# Patient Record
Sex: MALE | Race: WHITE | NOT HISPANIC OR LATINO
[De-identification: names, ages, dates, MRNs, and addresses within clinical notes are randomized per-mention and may not be internally consistent; named-entity substitution may affect disease eponyms.]

---

## 2017-01-09 ENCOUNTER — MEDICATION RENEWAL (OUTPATIENT)
Age: 72
End: 2017-01-09

## 2017-04-03 ENCOUNTER — RX RENEWAL (OUTPATIENT)
Age: 72
End: 2017-04-03

## 2017-04-09 ENCOUNTER — RX RENEWAL (OUTPATIENT)
Age: 72
End: 2017-04-09

## 2017-07-19 ENCOUNTER — APPOINTMENT (OUTPATIENT)
Dept: ENDOCRINOLOGY | Facility: CLINIC | Age: 72
End: 2017-07-19
Payer: MEDICARE

## 2017-07-19 VITALS
BODY MASS INDEX: 29.82 KG/M2 | WEIGHT: 190 LBS | HEIGHT: 67 IN | DIASTOLIC BLOOD PRESSURE: 80 MMHG | OXYGEN SATURATION: 98 % | SYSTOLIC BLOOD PRESSURE: 120 MMHG | HEART RATE: 70 BPM

## 2017-07-19 PROCEDURE — 99213 OFFICE O/P EST LOW 20 MIN: CPT

## 2017-07-20 LAB
T4 FREE SERPL-MCNC: 1.2 NG/DL
THYROGLOB AB SERPL-ACNC: <20 IU/ML
THYROGLOB SERPL-MCNC: <0.2 NG/ML
TSH SERPL-ACNC: 0.36 UIU/ML

## 2017-07-30 ENCOUNTER — FORM ENCOUNTER (OUTPATIENT)
Age: 72
End: 2017-07-30

## 2017-07-31 ENCOUNTER — APPOINTMENT (OUTPATIENT)
Dept: ULTRASOUND IMAGING | Facility: CLINIC | Age: 72
End: 2017-07-31
Payer: MEDICARE

## 2017-07-31 ENCOUNTER — OUTPATIENT (OUTPATIENT)
Dept: OUTPATIENT SERVICES | Facility: HOSPITAL | Age: 72
LOS: 1 days | End: 2017-07-31
Payer: MEDICARE

## 2017-07-31 DIAGNOSIS — C73 MALIGNANT NEOPLASM OF THYROID GLAND: ICD-10-CM

## 2017-07-31 PROCEDURE — 76536 US EXAM OF HEAD AND NECK: CPT

## 2017-07-31 PROCEDURE — 76536 US EXAM OF HEAD AND NECK: CPT | Mod: 26

## 2017-10-24 ENCOUNTER — APPOINTMENT (OUTPATIENT)
Dept: OPHTHALMOLOGY | Facility: CLINIC | Age: 72
End: 2017-10-24
Payer: MEDICARE

## 2017-10-24 DIAGNOSIS — H54.61 UNQUALIFIED VISUAL LOSS, RIGHT EYE, NORMAL VISION LEFT EYE: ICD-10-CM

## 2017-10-24 PROCEDURE — 92014 COMPRE OPH EXAM EST PT 1/>: CPT

## 2017-10-24 PROCEDURE — 92134 CPTRZ OPH DX IMG PST SGM RTA: CPT

## 2017-10-24 PROCEDURE — 92083 EXTENDED VISUAL FIELD XM: CPT

## 2017-11-13 ENCOUNTER — APPOINTMENT (OUTPATIENT)
Dept: OPHTHALMOLOGY | Facility: CLINIC | Age: 72
End: 2017-11-13
Payer: MEDICARE

## 2017-11-13 DIAGNOSIS — H43.392 OTHER VITREOUS OPACITIES, LEFT EYE: ICD-10-CM

## 2017-11-13 DIAGNOSIS — H25.13 AGE-RELATED NUCLEAR CATARACT, BILATERAL: ICD-10-CM

## 2017-11-13 DIAGNOSIS — H43.811 VITREOUS DEGENERATION, RIGHT EYE: ICD-10-CM

## 2017-11-13 PROCEDURE — 92134 CPTRZ OPH DX IMG PST SGM RTA: CPT

## 2017-11-13 PROCEDURE — 92225: CPT | Mod: RT

## 2017-11-13 PROCEDURE — 92014 COMPRE OPH EXAM EST PT 1/>: CPT

## 2017-12-18 ENCOUNTER — RX RENEWAL (OUTPATIENT)
Age: 72
End: 2017-12-18

## 2018-03-08 ENCOUNTER — APPOINTMENT (OUTPATIENT)
Dept: RADIOLOGY | Facility: IMAGING CENTER | Age: 73
End: 2018-03-08
Payer: MEDICARE

## 2018-03-08 ENCOUNTER — OUTPATIENT (OUTPATIENT)
Dept: OUTPATIENT SERVICES | Facility: HOSPITAL | Age: 73
LOS: 1 days | End: 2018-03-08
Payer: MEDICARE

## 2018-03-08 DIAGNOSIS — Z00.8 ENCOUNTER FOR OTHER GENERAL EXAMINATION: ICD-10-CM

## 2018-03-08 PROCEDURE — 71046 X-RAY EXAM CHEST 2 VIEWS: CPT | Mod: 26

## 2018-03-08 PROCEDURE — 71046 X-RAY EXAM CHEST 2 VIEWS: CPT

## 2018-03-19 ENCOUNTER — OUTPATIENT (OUTPATIENT)
Dept: OUTPATIENT SERVICES | Facility: HOSPITAL | Age: 73
LOS: 1 days | End: 2018-03-19
Payer: MEDICARE

## 2018-03-19 ENCOUNTER — APPOINTMENT (OUTPATIENT)
Dept: RADIOLOGY | Facility: IMAGING CENTER | Age: 73
End: 2018-03-19

## 2018-03-19 DIAGNOSIS — Z00.8 ENCOUNTER FOR OTHER GENERAL EXAMINATION: ICD-10-CM

## 2018-03-19 PROCEDURE — 71046 X-RAY EXAM CHEST 2 VIEWS: CPT | Mod: 26

## 2018-03-19 PROCEDURE — 71046 X-RAY EXAM CHEST 2 VIEWS: CPT

## 2018-03-29 ENCOUNTER — RX RENEWAL (OUTPATIENT)
Age: 73
End: 2018-03-29

## 2018-04-05 ENCOUNTER — APPOINTMENT (OUTPATIENT)
Dept: OPHTHALMOLOGY | Facility: CLINIC | Age: 73
End: 2018-04-05
Payer: MEDICARE

## 2018-04-05 DIAGNOSIS — H35.371 PUCKERING OF MACULA, RIGHT EYE: ICD-10-CM

## 2018-04-05 PROCEDURE — 92014 COMPRE OPH EXAM EST PT 1/>: CPT

## 2018-04-05 PROCEDURE — 92134 CPTRZ OPH DX IMG PST SGM RTA: CPT

## 2018-04-09 ENCOUNTER — APPOINTMENT (OUTPATIENT)
Dept: RADIOLOGY | Facility: IMAGING CENTER | Age: 73
End: 2018-04-09

## 2018-04-09 ENCOUNTER — OUTPATIENT (OUTPATIENT)
Dept: OUTPATIENT SERVICES | Facility: HOSPITAL | Age: 73
LOS: 1 days | End: 2018-04-09
Payer: MEDICARE

## 2018-04-09 DIAGNOSIS — Z00.8 ENCOUNTER FOR OTHER GENERAL EXAMINATION: ICD-10-CM

## 2018-04-09 PROCEDURE — 71046 X-RAY EXAM CHEST 2 VIEWS: CPT

## 2018-04-09 PROCEDURE — 71046 X-RAY EXAM CHEST 2 VIEWS: CPT | Mod: 26

## 2018-05-30 LAB
T4 FREE SERPL-MCNC: 1.5 NG/DL
THYROGLOB AB SERPL-ACNC: <20 IU/ML
THYROGLOB SERPL-MCNC: <0.2 NG/ML
TSH SERPL-ACNC: 0.57 UIU/ML

## 2018-06-06 ENCOUNTER — APPOINTMENT (OUTPATIENT)
Dept: ENDOCRINOLOGY | Facility: CLINIC | Age: 73
End: 2018-06-06
Payer: MEDICARE

## 2018-06-06 VITALS
OXYGEN SATURATION: 98 % | HEART RATE: 91 BPM | DIASTOLIC BLOOD PRESSURE: 70 MMHG | WEIGHT: 198 LBS | BODY MASS INDEX: 31.08 KG/M2 | HEIGHT: 67 IN | SYSTOLIC BLOOD PRESSURE: 130 MMHG

## 2018-06-06 PROCEDURE — 99214 OFFICE O/P EST MOD 30 MIN: CPT

## 2018-06-06 RX ORDER — LEVOTHYROXINE SODIUM 112 UG/1
112 TABLET ORAL
Refills: 0 | Status: DISCONTINUED | COMMUNITY
End: 2018-06-06

## 2018-06-25 ENCOUNTER — FORM ENCOUNTER (OUTPATIENT)
Age: 73
End: 2018-06-25

## 2018-06-26 ENCOUNTER — OUTPATIENT (OUTPATIENT)
Dept: OUTPATIENT SERVICES | Facility: HOSPITAL | Age: 73
LOS: 1 days | End: 2018-06-26
Payer: MEDICARE

## 2018-06-26 ENCOUNTER — APPOINTMENT (OUTPATIENT)
Dept: ULTRASOUND IMAGING | Facility: IMAGING CENTER | Age: 73
End: 2018-06-26
Payer: MEDICARE

## 2018-06-26 DIAGNOSIS — C73 MALIGNANT NEOPLASM OF THYROID GLAND: ICD-10-CM

## 2018-06-26 PROCEDURE — 76536 US EXAM OF HEAD AND NECK: CPT

## 2018-06-26 PROCEDURE — 76536 US EXAM OF HEAD AND NECK: CPT | Mod: 26

## 2018-10-04 ENCOUNTER — APPOINTMENT (OUTPATIENT)
Dept: OPHTHALMOLOGY | Facility: CLINIC | Age: 73
End: 2018-10-04
Payer: MEDICARE

## 2018-10-04 DIAGNOSIS — H35.373 PUCKERING OF MACULA, BILATERAL: ICD-10-CM

## 2018-10-04 PROCEDURE — 92012 INTRM OPH EXAM EST PATIENT: CPT

## 2018-12-29 ENCOUNTER — TRANSCRIPTION ENCOUNTER (OUTPATIENT)
Age: 73
End: 2018-12-29

## 2019-04-01 ENCOUNTER — TRANSCRIPTION ENCOUNTER (OUTPATIENT)
Age: 74
End: 2019-04-01

## 2019-04-01 ENCOUNTER — APPOINTMENT (OUTPATIENT)
Dept: ENDOCRINOLOGY | Facility: CLINIC | Age: 74
End: 2019-04-01
Payer: MEDICARE

## 2019-04-01 VITALS
SYSTOLIC BLOOD PRESSURE: 110 MMHG | BODY MASS INDEX: 31.23 KG/M2 | HEIGHT: 67 IN | DIASTOLIC BLOOD PRESSURE: 80 MMHG | WEIGHT: 199 LBS

## 2019-04-01 PROCEDURE — 99213 OFFICE O/P EST LOW 20 MIN: CPT

## 2019-04-01 NOTE — PHYSICAL EXAM
[Well Nourished] : well nourished [Supple] : the neck was supple [No LAD] : no lymphadenopathy [Well Healed Scar] : well healed scar [Normal Rate] : heart rate was normal  [Normal S1, S2] : normal S1 and S2 [Regular Rhythm] : with a regular rhythm [Abdominal Aorta Normal] : the abdominal aorta was normal [Femoral Arteries Normal] : femoral pulses were normal without bruits [Soft] : abdomen soft [Not Distended] : not distended [Post Cervical Nodes] : posterior cervical nodes [Anterior Cervical Nodes] : anterior cervical nodes [No Motor Deficits] : the motor exam was normal [No Tremors] : no tremors [Oriented x3] : oriented to person, place, and time [Normal Affect] : the affect was normal [Normal Mood] : the mood was normal [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

## 2019-04-01 NOTE — HISTORY OF PRESENT ILLNESS
[FreeTextEntry1] : Patient is a 73-year-old male with history of hypertension, here for follow up visit papillary thyroid cancer.  Patient is hearing impaired and comes with .  He was diagnosed with papillary thyroid cancer in 2010.  Largest tumor or dementia was 1.8 cm, papillary thyroid cancer, T3 Nx, with minimal extrathyroidal extension.  He was treated with total thyroidectomy followed by radioactive iodine in 2010, 150 mci and had whole body scan in 2011 which showed less than 1% uptake. \par His most recent thyroid ultrasound was in June 2018, there is no evidence of locally metastatic disease.  \par He is currently taking levothyroxine 100 mcg daily.  He stated that his dose was change about one month ago by his cardiologist in the setting of his most recent blood work.  Prior to that he was taking levothyroxine 112 mcg daily.  \par His last thyroglobulin level was checked in May 2018, less than 0.2 ng/mL, thyroglobulin antibody was less than 20 international units/mL.  He has been doing well.  He reported taking some weight over the last 6 months.\par

## 2019-04-01 NOTE — ASSESSMENT
[FreeTextEntry1] : 73 -year-old male with history of thyroid cancer.\par \par \par #1 Thyroid cancer\par He had 1.8 cm papillary thyroid cancer with minimal extrathyroidal extension T3Nx treated with surgery and radioactive iodine in 2010. He is clinically euthyroid.  He has excellent biochemical and structural response.  TSH goal between 0.5-2.0uIU/mL.  He is currently on levothyroxine 100 mcg for the last one month after the dose was adjusted by his cardiologist.  We will check his TSH level today and adjust his thyroid medication as needed.  We'll also check his thyroglobulin level.  Repeat thyroid ultrasound and one year.  He will forward his laboratory results to his cardiologist and primary care doctor.

## 2019-04-03 LAB
ESTIMATED AVERAGE GLUCOSE: 105 MG/DL
HBA1C MFR BLD HPLC: 5.3 %
THYROGLOB AB SERPL-ACNC: <20 IU/ML
THYROGLOB SERPL-MCNC: <0.2 NG/ML
TSH SERPL-ACNC: 1.18 UIU/ML

## 2019-10-08 ENCOUNTER — APPOINTMENT (OUTPATIENT)
Dept: OPHTHALMOLOGY | Facility: CLINIC | Age: 74
End: 2019-10-08

## 2019-10-15 ENCOUNTER — APPOINTMENT (OUTPATIENT)
Dept: ENDOCRINOLOGY | Facility: CLINIC | Age: 74
End: 2019-10-15
Payer: MEDICARE

## 2019-10-15 VITALS
HEIGHT: 67 IN | BODY MASS INDEX: 31.39 KG/M2 | DIASTOLIC BLOOD PRESSURE: 80 MMHG | WEIGHT: 200 LBS | HEART RATE: 70 BPM | OXYGEN SATURATION: 97 % | SYSTOLIC BLOOD PRESSURE: 112 MMHG

## 2019-10-15 PROCEDURE — 99213 OFFICE O/P EST LOW 20 MIN: CPT

## 2019-10-15 NOTE — DATA REVIEWED
[FreeTextEntry1] : EXAM: US HEAD NECK SOFT TISSUE \par PROCEDURE DATE: 06/26/2018 \par  \par INTERPRETATION: Clinical data: 72-year-old man status post thyroidectomy for carcinoma. \par Comparison: Ultrasound 07/31/2017 \par Technique: Survey of the thyroid beds and cervical lymph node chains was performed. \par Findings: No soft tissue mass was identified within the thyroid beds. There was no cervical lymphadenopathy. \par Impression: \par No evidence recurrent or locally metastatic disease.

## 2019-10-15 NOTE — ASSESSMENT
[FreeTextEntry1] : 73 -year-old male with history of thyroid cancer.\par \par #1 Thyroid cancer\par He had 1.8 cm papillary thyroid cancer with minimal extrathyroidal extension T3Nx treated with surgery and radioactive iodine in 2010. He is clinically euthyroid.  He has excellent biochemical and structural response.  TSH goal between 0.5-2.0uIU/mL.  He is currently on levothyroxine 100 mcg for the last one month after the dose was adjusted by his cardiologist.  We will check his TSH level today and adjust his thyroid medication as needed.  We'll also check his thyroglobulin level.  Repeat thyroid ultrasound and one year. \par \par  [Levothyroxine] : The patient was instructed to take Levothyroxine on an empty stomach, separate from vitamins, and wait at least 30 minutes before eating

## 2019-10-15 NOTE — PHYSICAL EXAM
[Well Nourished] : well nourished [Supple] : the neck was supple [No LAD] : no lymphadenopathy [Well Healed Scar] : well healed scar [Normal Rate] : heart rate was normal  [Normal S1, S2] : normal S1 and S2 [Abdominal Aorta Normal] : the abdominal aorta was normal [Regular Rhythm] : with a regular rhythm [Femoral Arteries Normal] : femoral pulses were normal without bruits [Soft] : abdomen soft [Not Distended] : not distended [Post Cervical Nodes] : posterior cervical nodes [Anterior Cervical Nodes] : anterior cervical nodes [No Motor Deficits] : the motor exam was normal [No Tremors] : no tremors [Oriented x3] : oriented to person, place, and time [Normal Affect] : the affect was normal [Normal Mood] : the mood was normal [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

## 2019-10-15 NOTE — HISTORY OF PRESENT ILLNESS
[FreeTextEntry1] : Patient is a 73-year-old male with history of hypertension, here for follow up visit papillary thyroid cancer.  Patient is hearing impaired and comes with .  He was diagnosed with papillary thyroid cancer in 2010.  Largest tumor or dementia was 1.8 cm, papillary thyroid cancer, T3 Nx, with minimal extrathyroidal extension.  He was treated with total thyroidectomy followed by radioactive iodine in 2010, 150 mci and had whole body scan in 2011 which showed less than 1% uptake. \par \par His most recent thyroid ultrasound was in June 2018, there is no evidence of locally metastatic disease.  \par \par He is currently taking levothyroxine 100 mcg daily.  He stated that his dose was change about one month ago by his cardiologist in the setting of his most recent blood work.  Prior to that he was taking levothyroxine 112 mcg daily.  \par \par His last thyroglobulin level was checked in May 2018, less than 0.2 ng/mL, thyroglobulin antibody was less than 20 iu/mL.  He has been doing well.  \par \par He has been doing very well overall.

## 2019-10-24 ENCOUNTER — CLINICAL ADVICE (OUTPATIENT)
Age: 74
End: 2019-10-24

## 2019-10-24 LAB
T4 FREE SERPL-MCNC: 1.2 NG/DL
THYROGLOB AB SERPL-ACNC: <20 IU/ML
THYROGLOB SERPL-MCNC: <0.2 NG/ML
TSH SERPL-ACNC: 4.17 UIU/ML

## 2019-11-04 ENCOUNTER — APPOINTMENT (OUTPATIENT)
Dept: OPHTHALMOLOGY | Facility: CLINIC | Age: 74
End: 2019-11-04
Payer: MEDICARE

## 2019-11-04 ENCOUNTER — NON-APPOINTMENT (OUTPATIENT)
Age: 74
End: 2019-11-04

## 2019-11-04 PROCEDURE — 92014 COMPRE OPH EXAM EST PT 1/>: CPT

## 2019-11-08 ENCOUNTER — RX RENEWAL (OUTPATIENT)
Age: 74
End: 2019-11-08

## 2019-11-08 LAB — TSH SERPL-ACNC: 4.08 UIU/ML

## 2020-01-01 ENCOUNTER — TRANSCRIPTION ENCOUNTER (OUTPATIENT)
Age: 75
End: 2020-01-01

## 2020-05-02 ENCOUNTER — RX RENEWAL (OUTPATIENT)
Age: 75
End: 2020-05-02

## 2020-09-01 ENCOUNTER — OUTPATIENT (OUTPATIENT)
Dept: OUTPATIENT SERVICES | Facility: HOSPITAL | Age: 75
LOS: 1 days | End: 2020-09-01
Payer: MEDICARE

## 2020-09-01 ENCOUNTER — RESULT REVIEW (OUTPATIENT)
Age: 75
End: 2020-09-01

## 2020-09-01 ENCOUNTER — APPOINTMENT (OUTPATIENT)
Dept: ULTRASOUND IMAGING | Facility: CLINIC | Age: 75
End: 2020-09-01
Payer: MEDICARE

## 2020-09-01 DIAGNOSIS — C73 MALIGNANT NEOPLASM OF THYROID GLAND: ICD-10-CM

## 2020-09-01 PROCEDURE — 76536 US EXAM OF HEAD AND NECK: CPT | Mod: 26

## 2020-09-01 PROCEDURE — 76536 US EXAM OF HEAD AND NECK: CPT

## 2020-09-14 ENCOUNTER — APPOINTMENT (OUTPATIENT)
Dept: ENDOCRINOLOGY | Facility: CLINIC | Age: 75
End: 2020-09-14
Payer: MEDICARE

## 2020-09-14 VITALS
OXYGEN SATURATION: 97 % | DIASTOLIC BLOOD PRESSURE: 60 MMHG | WEIGHT: 195 LBS | HEART RATE: 58 BPM | TEMPERATURE: 96.3 F | BODY MASS INDEX: 30.61 KG/M2 | SYSTOLIC BLOOD PRESSURE: 110 MMHG | HEIGHT: 67 IN

## 2020-09-14 PROCEDURE — 99213 OFFICE O/P EST LOW 20 MIN: CPT

## 2020-09-14 NOTE — PHYSICAL EXAM
[Oriented x3] : oriented to person, place, and time [Normal Affect] : the affect was normal [Normal Mood] : the mood was normal [de-identified] : Patient appears alert.  Well-nourished.  Healthy appearance. [de-identified] : There is no proptosis. [de-identified] : Deferred due to mask wearing mandates. [de-identified] : There is no lymphadenopathy.  There is a well-healed scar. [de-identified] : No respiratory distress.  No accessory muscle use.  Normal rate and effort.  Clear to auscultation. [de-identified] : There is no murmurs, rubs, normal S1-S2. [de-identified] : There is no edema.

## 2020-09-14 NOTE — DATA REVIEWED
[FreeTextEntry1] : Thyroglobulin levels.\par September 14, 2020 thyroglobulin <0.2\par October 15, 2019, thyroglobulin level <0.2\par April 1, 2019, thyroglobulin level <0.2\par \par TSH\par 9/14/2020, TSH 1.34 levothyroxine 112 mcg\par 11/4/2019 TSH 4.08 levothyroxine 100 mcg\par

## 2020-09-14 NOTE — PROCEDURE
[FreeTextEntry1] : \par  US Head + Neck Soft Tissue             Final\par \par No Documents Attached\par \par \par \par \par   EXAM:  US HEAD NECK SOFT TISSUE\par \par \par PROCEDURE DATE:  09/01/2020\par \par \par \par INTERPRETATION:  CLINICAL INDICATION: Status post thyroidectomy for thyroid carcinoma. Evaluate for recurrent disease.\par TECHNIQUE: High-resolution linear transducer was utilized to evaluate the cervical soft tissues.\par Comparison evaluation: Ultrasound of the cervical soft tissues dated 6/26/2018.\par \par FINDINGS:\par \par No evidence of recurrent disease in the thyroidectomy bed.\par No evidence of adenopathy.\par \par IMPRESSION:\par \par No evidence of recurrent disease. No significant change compared with prior study.\par \par NIR THOMAS M.D., ATTENDING RADIOLOGIST\par This document has been electronically signed. Sep  2 2020  4:00PM\par \par

## 2020-09-14 NOTE — ASSESSMENT
[FreeTextEntry1] : Patient is a 74-year-old, hearing impaired, gentleman, with history of thyroid cancer, here for endocrinology follow-up for papillary thyroid cancer.\par \par \par #1 Thyroid cancer\par He had 1.8 cm papillary thyroid cancer with minimal extrathyroidal extension T3Nx treated with surgery and radioactive iodine in 2010. He is clinically euthyroid.  He has excellent biochemical and structural response.  TSH goal between 0.5-2.0uIU/mL.  He is currently taking levothyroxine 112 mcg once daily.  We will check his TSH level today and adjust his thyroid medication as needed.  We'll also check his thyroglobulin level.  Repeat thyroid ultrasound 1 to 2 years..

## 2020-09-14 NOTE — HISTORY OF PRESENT ILLNESS
[FreeTextEntry1] : Patient is a 74-year-old male with history of hypertension, here for follow up visit papillary thyroid cancer.  Patient is hearing impaired and comes with .  He was diagnosed with papillary thyroid cancer in 2010.  Largest tumor or dementia was 1.8 cm, papillary thyroid cancer, T3 Nx, with minimal extrathyroidal extension.  He was treated with total thyroidectomy followed by radioactive iodine in 2010, 150 mci and had whole body scan in 2011 which showed less than 1% uptake. \par \par His most recent thyroid ultrasound was in June 2018, there is no evidence of locally metastatic disease.  Another repeat ultrasound was done earlier this month.  September 3, 2020 no evidence of recurrent disease in the thyroidectomy bed.  No evidence of adenopathy.\par \par He is currently taking levothyroxine 112 mcg daily.  \par \par His last thyroglobulin level was checked in May 2018, less than 0.2 ng/mL, thyroglobulin antibody was less than 20 iu/mL.  He has been doing well.  Last thyroglobulin was done in September 2020 and is less than 0.2 ng/ml. \par \par He reports he has dry mouth in the middle of the night.  Improves with drinking water.  Worsens with eating fried food and more dry food.  But otherwise is doing very well.

## 2020-09-16 LAB
T4 FREE SERPL-MCNC: 1.2 NG/DL
THYROGLOB AB SERPL-ACNC: <20 IU/ML
THYROGLOB SERPL-MCNC: <0.2 NG/ML
TSH SERPL-ACNC: 1.34 UIU/ML

## 2020-10-26 ENCOUNTER — RX RENEWAL (OUTPATIENT)
Age: 75
End: 2020-10-26

## 2021-01-16 ENCOUNTER — TRANSCRIPTION ENCOUNTER (OUTPATIENT)
Age: 76
End: 2021-01-16

## 2021-02-09 ENCOUNTER — APPOINTMENT (OUTPATIENT)
Dept: ENDOCRINOLOGY | Facility: CLINIC | Age: 76
End: 2021-02-09
Payer: MEDICARE

## 2021-02-09 VITALS
BODY MASS INDEX: 30.61 KG/M2 | TEMPERATURE: 97 F | WEIGHT: 195 LBS | DIASTOLIC BLOOD PRESSURE: 70 MMHG | SYSTOLIC BLOOD PRESSURE: 112 MMHG | HEIGHT: 67 IN

## 2021-02-09 PROCEDURE — 99214 OFFICE O/P EST MOD 30 MIN: CPT

## 2021-02-09 RX ORDER — EZETIMIBE 10 MG/1
10 TABLET ORAL
Refills: 0 | Status: ACTIVE | COMMUNITY
Start: 2021-02-09

## 2021-02-09 RX ORDER — LOSARTAN POTASSIUM 25 MG/1
25 TABLET, FILM COATED ORAL
Refills: 0 | Status: DISCONTINUED | COMMUNITY
Start: 2017-07-19 | End: 2021-02-09

## 2021-02-09 NOTE — HISTORY OF PRESENT ILLNESS
[FreeTextEntry1] : Patient is a 75-year-old male with history of hypertension, here for follow up visit papillary thyroid cancer.  Patient is hearing impaired and comes with .  He was diagnosed with papillary thyroid cancer in 2010.  Largest tumor or dementia was 1.8 cm, papillary thyroid cancer, T3 Nx, with minimal extrathyroidal extension.  He was treated with total thyroidectomy followed by radioactive iodine in 2010, 150 mci and had whole body scan in 2011 which showed less than 1% uptake. \par \par His most recent thyroid ultrasound was in June 2018, there is no evidence of locally metastatic disease.  Another repeat ultrasound was done earlier this month.  September 3, 2020 no evidence of recurrent disease in the thyroidectomy bed.  No evidence of adenopathy.\par \par He is currently taking levothyroxine 112 mcg daily.  \par \par His last thyroglobulin level was checked in May 2018, less than 0.2 ng/mL, thyroglobulin antibody was less than 20 iu/mL.  He has been doing well.  Last thyroglobulin was done in September 2020 and is less than 0.2 ng/ml. \par \par He has been doing well since last visit.  US was done in Sept 2020 showing no evidence of recurrent disease.

## 2021-02-09 NOTE — ASSESSMENT
[FreeTextEntry1] : 75 -year-old male with history of thyroid cancer.\par \par \par #1 Thyroid cancer\par He had 1.8 cm papillary thyroid cancer with minimal extrathyroidal extension T3Nx treated with surgery and radioactive iodine in 2010. He is clinically euthyroid.  He has excellent biochemical and structural response.  TSH goal between 0.5-2.0uIU/mL.  He is currently on levothyroxine 112mcg daily. \par US in Sept 2020 was negative. \par TG in Sept 2020 was undetectable. \par \par #2 HTN\par Recently with hypertension, his cardiologist changed his medication from Losartan 50mg daily to Losartan -HCTZ 50-12.5mg once daily, states that his blood pressure has been much improved since. \par \par #3 HLD\par Patient is currently on Atorvastatin 40mg daily, tolearting well, no issues. \par Patient is also currently on Zetia 10mg daily \par FU with cardiology.

## 2021-02-09 NOTE — DATA REVIEWED
[FreeTextEntry1] : Thyroglobulin levels.\par September 14, 2020 thyroglobulin <0.2\par October 15, 2019, thyroglobulin level <0.2\par April 1, 2019, thyroglobulin level <0.2\par \par TSH\par 9/14/2020, TSH 1.34 levothyroxine 112 mcg\par 11/4/2019 TSH 4.08 levothyroxine 100 mcg\par \par \par  US Head + Neck Soft Tissue             Final\par \par No Documents Attached\par \par \par \par \par   EXAM:  US HEAD NECK SOFT TISSUE\par \par \par PROCEDURE DATE:  09/01/2020\par \par \par \par INTERPRETATION:  CLINICAL INDICATION: Status post thyroidectomy for thyroid carcinoma. Evaluate for recurrent disease.\par TECHNIQUE: High-resolution linear transducer was utilized to evaluate the cervical soft tissues.\par Comparison evaluation: Ultrasound of the cervical soft tissues dated 6/26/2018.\par \par FINDINGS:\par \par No evidence of recurrent disease in the thyroidectomy bed.\par No evidence of adenopathy.\par \par IMPRESSION:\par \par No evidence of recurrent disease. No significant change compared with prior study.\par \par \par \par \par \par \par NIR THOMAS M.D., ATTENDING RADIOLOGIST\par This document has been electronically signed. Sep  2 2020  4:00PM\par \par \par

## 2021-02-09 NOTE — PHYSICAL EXAM
[Normal Affect] : the affect was normal [Oriented x3] : oriented to person, place, and time [Normal Mood] : the mood was normal [de-identified] : Patient appears alert.  Well-nourished.  Healthy appearance. [de-identified] : There is no proptosis. [de-identified] : Deferred due to mask wearing mandates. [de-identified] : There is no lymphadenopathy.  There is a well-healed scar. [de-identified] : No respiratory distress.  No accessory muscle use.  Normal rate and effort.  Clear to auscultation. [de-identified] : There is no murmurs, rubs, normal S1-S2. [de-identified] : There is no edema.

## 2021-02-16 LAB
T4 FREE SERPL-MCNC: 1.3 NG/DL
THYROGLOB AB SERPL-ACNC: <20 IU/ML
THYROGLOB SERPL-MCNC: <0.2 NG/ML
TSH SERPL-ACNC: 0.3 UIU/ML

## 2021-03-11 ENCOUNTER — NON-APPOINTMENT (OUTPATIENT)
Age: 76
End: 2021-03-11

## 2021-03-11 ENCOUNTER — APPOINTMENT (OUTPATIENT)
Dept: OPHTHALMOLOGY | Facility: CLINIC | Age: 76
End: 2021-03-11
Payer: MEDICARE

## 2021-03-11 PROCEDURE — 92014 COMPRE OPH EXAM EST PT 1/>: CPT

## 2021-04-03 ENCOUNTER — RX RENEWAL (OUTPATIENT)
Age: 76
End: 2021-04-03

## 2021-08-10 ENCOUNTER — APPOINTMENT (OUTPATIENT)
Dept: ENDOCRINOLOGY | Facility: CLINIC | Age: 76
End: 2021-08-10
Payer: MEDICARE

## 2021-08-10 VITALS
HEIGHT: 67 IN | BODY MASS INDEX: 30.61 KG/M2 | WEIGHT: 195 LBS | SYSTOLIC BLOOD PRESSURE: 110 MMHG | HEART RATE: 66 BPM | DIASTOLIC BLOOD PRESSURE: 70 MMHG | OXYGEN SATURATION: 99 %

## 2021-08-10 LAB
ALBUMIN SERPL ELPH-MCNC: 4.5 G/DL
ALP BLD-CCNC: 61 U/L
ALT SERPL-CCNC: 23 U/L
ANION GAP SERPL CALC-SCNC: 12 MMOL/L
AST SERPL-CCNC: 25 U/L
BILIRUB SERPL-MCNC: 1 MG/DL
BUN SERPL-MCNC: 16 MG/DL
CALCIUM SERPL-MCNC: 10.1 MG/DL
CHLORIDE SERPL-SCNC: 100 MMOL/L
CHOLEST SERPL-MCNC: 148 MG/DL
CO2 SERPL-SCNC: 28 MMOL/L
CREAT SERPL-MCNC: 1.35 MG/DL
GLUCOSE SERPL-MCNC: 93 MG/DL
HDLC SERPL-MCNC: 53 MG/DL
LDLC SERPL CALC-MCNC: 62 MG/DL
NONHDLC SERPL-MCNC: 95 MG/DL
POTASSIUM SERPL-SCNC: 4.4 MMOL/L
PROT SERPL-MCNC: 6.7 G/DL
SODIUM SERPL-SCNC: 140 MMOL/L
T4 FREE SERPL-MCNC: 1.4 NG/DL
THYROGLOB AB SERPL-ACNC: <20 IU/ML
THYROGLOB SERPL-MCNC: <0.2 NG/ML
TRIGL SERPL-MCNC: 167 MG/DL
TSH SERPL-ACNC: 0.42 UIU/ML

## 2021-08-10 PROCEDURE — 99214 OFFICE O/P EST MOD 30 MIN: CPT

## 2021-08-10 NOTE — HISTORY OF PRESENT ILLNESS
[FreeTextEntry1] : Patient is a 75-year-old male with history of hypertension, here for follow up visit papillary thyroid cancer.  Patient is hearing impaired and comes with .  He was diagnosed with papillary thyroid cancer in 2010.  Largest tumor or dementia was 1.8 cm, papillary thyroid cancer, T3 Nx, with minimal extrathyroidal extension.  He was treated with total thyroidectomy followed by radioactive iodine in 2010, 150 mci and had whole body scan in 2011 which showed less than 1% uptake. \par \par His thyroid ultrasound in June 2018, there is no evidence of locally metastatic disease.  Another repeat ultrasound was done earlier this month.  September 3, 2020 no evidence of recurrent disease in the thyroidectomy bed.  No evidence of adenopathy.\par \par He is currently taking levothyroxine 112 mcg daily.  \par \par He has been doing well since last visit.  \par \par In regards to his hypertension, he is currently on losartan 50–hydrochlorothiazide 12.5 mg once daily.\par In regards to his hyperlipidemia, currently being managed by his cardiologist.  He is on Zetia 10 mg once daily as well as atorvastatin 40 mg once daily.\par \par Patient is currently is taking supplements including Centrum multivitamin, vitamin D 1000 IU daily, and  calcium supplement.

## 2021-08-10 NOTE — ASSESSMENT
[FreeTextEntry1] : Patient is a 75-year-old man with history of hypertension, hyperlipidemia, papillary thyroid cancer, status post radioactive iodine treatment in 2010, here for endocrinology follow-up.\par \par \par 1.  Papillary thyroid cancer.\par He had 1.8 cm papillary thyroid cancer with minimal extrathyroidal extension T3Nx treated with surgery and radioactive iodine in 2010. He is clinically euthyroid.  He has excellent biochemical and structural response.  TSH goal between 0.5-2.0uIU/mL.  He is currently on levothyroxine 100 mcg for the last one month after the dose was adjusted by his cardiologist. I will forward his laboratory results to his cardiologist and primary care doctor.\par Last ultrasound done in September 3, 2020: No evidence of recurrent disease.  No significant change compared to prior study.\par We will check thyroid ultrasound.\par We will obtain TSH, free T4 and thyroglobulin level today.\par \par 2.  Hypertension\par Patient is currently on losartan, hydrochlorothiazide 50-12.5 mg once daily.\par \par 3.  Hyperlipidemia\par Patient is on atorvastatin 40 mg once daily, tolerating well, no issues.\par Patient is currently on Zetia 10 mg once daily.\par Continue to follow with cardiology.

## 2021-08-10 NOTE — PHYSICAL EXAM
[Oriented x3] : oriented to person, place, and time [Normal Affect] : the affect was normal [Normal Mood] : the mood was normal [de-identified] : Patient appears alert.  Well-nourished.  Healthy appearance. [de-identified] : There is no proptosis. [de-identified] : Deferred due to mask wearing mandates. [de-identified] : There is no lymphadenopathy.  There is a well-healed scar. [de-identified] : No respiratory distress.  No accessory muscle use.  Normal rate and effort.  Clear to auscultation. [de-identified] : There is no murmurs, rubs, normal S1-S2. [de-identified] : There is no edema.

## 2021-08-10 NOTE — REASON FOR VISIT
[Follow - Up] : a follow-up visit [Thyroid Cancer] : thyroid cancer [Ad Hoc ] : provided by an ad hoc  [Source: ______] : History obtained from [facility] [FreeTextEntry3] : AMER[employer] S[employer] LANGUAGE

## 2021-09-08 ENCOUNTER — TRANSCRIPTION ENCOUNTER (OUTPATIENT)
Age: 76
End: 2021-09-08

## 2021-09-21 ENCOUNTER — OUTPATIENT (OUTPATIENT)
Dept: OUTPATIENT SERVICES | Facility: HOSPITAL | Age: 76
LOS: 1 days | End: 2021-09-21
Payer: MEDICARE

## 2021-09-21 ENCOUNTER — APPOINTMENT (OUTPATIENT)
Dept: ULTRASOUND IMAGING | Facility: CLINIC | Age: 76
End: 2021-09-21
Payer: MEDICARE

## 2021-09-21 DIAGNOSIS — C73 MALIGNANT NEOPLASM OF THYROID GLAND: ICD-10-CM

## 2021-09-21 PROCEDURE — 76536 US EXAM OF HEAD AND NECK: CPT

## 2021-09-21 PROCEDURE — 76536 US EXAM OF HEAD AND NECK: CPT | Mod: 26

## 2021-09-27 ENCOUNTER — RX RENEWAL (OUTPATIENT)
Age: 76
End: 2021-09-27

## 2022-02-18 ENCOUNTER — APPOINTMENT (OUTPATIENT)
Dept: ENDOCRINOLOGY | Facility: CLINIC | Age: 77
End: 2022-02-18
Payer: MEDICARE

## 2022-02-18 VITALS
OXYGEN SATURATION: 96 % | WEIGHT: 201 LBS | HEART RATE: 66 BPM | SYSTOLIC BLOOD PRESSURE: 120 MMHG | BODY MASS INDEX: 31.48 KG/M2 | TEMPERATURE: 97.8 F | DIASTOLIC BLOOD PRESSURE: 80 MMHG

## 2022-02-18 DIAGNOSIS — E78.5 HYPERLIPIDEMIA, UNSPECIFIED: ICD-10-CM

## 2022-02-18 DIAGNOSIS — I10 ESSENTIAL (PRIMARY) HYPERTENSION: ICD-10-CM

## 2022-02-18 PROCEDURE — 99214 OFFICE O/P EST MOD 30 MIN: CPT

## 2022-02-18 RX ORDER — LOSARTAN POTASSIUM AND HYDROCHLOROTHIAZIDE 12.5; 5 MG/1; MG/1
50-12.5 TABLET ORAL
Refills: 0 | Status: DISCONTINUED | COMMUNITY
End: 2022-02-18

## 2022-02-18 RX ORDER — LOSARTAN POTASSIUM 50 MG/1
50 TABLET, FILM COATED ORAL
Refills: 0 | Status: ACTIVE | COMMUNITY
Start: 2022-02-18

## 2022-02-18 NOTE — HISTORY OF PRESENT ILLNESS
[FreeTextEntry1] : Patient is a 76year-old male with history of hypertension, here for follow up visit papillary thyroid cancer. \par  \par Patient is hearing impaired and comes with .  \par \par He was diagnosed with papillary thyroid cancer in 2010.  Largest tumor or dementia was 1.8 cm, papillary thyroid cancer, T3 Nx, with minimal extrathyroidal extension.  He was treated with total thyroidectomy followed by radioactive iodine in 2010, 150 mci and had whole body scan in 2011 which showed less than 1% uptake. \par \par His thyroid ultrasound in June 2018, there is no evidence of locally metastatic disease.  Another repeat ultrasound was done earlier this month.  September 3, 2020 no evidence of recurrent disease in the thyroidectomy bed.  No evidence of adenopathy. US again in Sept 2021 within normal li mits.  \par \par He is currently taking levothyroxine 112 mcg daily.  \par \par He has been doing well since last visit.  \par \par In regards to his hypertension, he is currently on losartan 50mg once daily \par \par In regards to his hyperlipidemia, currently being managed by his cardiologist.  He is on Zetia 10 mg once daily as well as atorvastatin 40 mg once daily.\par \par Currently off vitamin D and calcium supplements.

## 2022-02-18 NOTE — PHYSICAL EXAM
[Oriented x3] : oriented to person, place, and time [Normal Affect] : the affect was normal [Normal Mood] : the mood was normal [de-identified] : Patient appears alert.  Well-nourished.  Healthy appearance. [de-identified] : There is no proptosis. [de-identified] : Deferred due to mask wearing mandates. [de-identified] : There is no lymphadenopathy.  There is a well-healed scar. [de-identified] : No respiratory distress.  No accessory muscle use.  Normal rate and effort.  Clear to auscultation. [de-identified] : There is no murmurs, rubs, normal S1-S2. [de-identified] : There is no edema.

## 2022-02-18 NOTE — ASSESSMENT
[FreeTextEntry1] : Patient is a 76-year-old man with history of hypertension, hyperlipidemia, papillary thyroid cancer, status post radioactive iodine treatment in 2010, here for endocrinology follow-up.\par \par 1.  Papillary thyroid cancer.\par He had 1.8 cm papillary thyroid cancer with minimal extrathyroidal extension T3Nx treated with surgery and radioactive iodine in 2010. He is clinically euthyroid.  He has excellent biochemical and structural response.  TSH goal between 0.5-2.0uIU/mL.  He is currently on levothyroxine 100 mcg for the last one month after the dose was adjusted by his cardiologist. I will forward his laboratory results to his cardiologist and primary care doctor.\par Also ultrasound done in September 21, 2021.  No Recurrent locally metastatic disease.\par Repeat ultrasound in September 2022.\par We will obtain TSH, free T4 and thyroglobulin level today.\par \par 2.  Hypertension\par Blood pressure today: 120/80\par Patient is currently on losartan, hydrochlorothiazide 50-12.5 mg once daily.\par \par 3.  Hyperlipidemia\par LDL 62 mg/dL which is currently on target blood was drawn in 8/10/2021\par Patient is on atorvastatin 40 mg once daily, tolerating well, no issues.\par Patient is currently on Zetia 10 mg once daily.\par Continue to follow with cardiology.\par \par Doing well in thyroid cancer persepective. \par FU in 1 year

## 2022-02-22 LAB
T4 FREE SERPL-MCNC: 1.5 NG/DL
THYROGLOB AB SERPL-ACNC: <20 IU/ML
THYROGLOB SERPL-MCNC: <0.2 NG/ML
TSH SERPL-ACNC: 0.48 UIU/ML

## 2022-04-13 ENCOUNTER — NON-APPOINTMENT (OUTPATIENT)
Age: 77
End: 2022-04-13

## 2022-04-13 ENCOUNTER — APPOINTMENT (OUTPATIENT)
Dept: OPHTHALMOLOGY | Facility: CLINIC | Age: 77
End: 2022-04-13
Payer: MEDICARE

## 2022-04-13 PROCEDURE — 92015 DETERMINE REFRACTIVE STATE: CPT

## 2022-04-13 PROCEDURE — 92134 CPTRZ OPH DX IMG PST SGM RTA: CPT

## 2022-04-13 PROCEDURE — 92014 COMPRE OPH EXAM EST PT 1/>: CPT

## 2022-06-25 ENCOUNTER — NON-APPOINTMENT (OUTPATIENT)
Age: 77
End: 2022-06-25

## 2022-09-20 ENCOUNTER — RX RENEWAL (OUTPATIENT)
Age: 77
End: 2022-09-20

## 2022-11-05 ENCOUNTER — NON-APPOINTMENT (OUTPATIENT)
Age: 77
End: 2022-11-05

## 2023-03-14 ENCOUNTER — APPOINTMENT (OUTPATIENT)
Dept: ENDOCRINOLOGY | Facility: CLINIC | Age: 78
End: 2023-03-14

## 2023-04-03 ENCOUNTER — RX RENEWAL (OUTPATIENT)
Age: 78
End: 2023-04-03

## 2023-04-24 ENCOUNTER — APPOINTMENT (OUTPATIENT)
Dept: ENDOCRINOLOGY | Facility: CLINIC | Age: 78
End: 2023-04-24
Payer: MEDICARE

## 2023-04-24 VITALS
BODY MASS INDEX: 31.08 KG/M2 | HEART RATE: 66 BPM | SYSTOLIC BLOOD PRESSURE: 110 MMHG | WEIGHT: 198 LBS | HEIGHT: 67 IN | DIASTOLIC BLOOD PRESSURE: 80 MMHG | OXYGEN SATURATION: 96 %

## 2023-04-24 DIAGNOSIS — E07.9 DISORDER OF THYROID, UNSPECIFIED: ICD-10-CM

## 2023-04-24 DIAGNOSIS — C73 MALIGNANT NEOPLASM OF THYROID GLAND: ICD-10-CM

## 2023-04-24 PROCEDURE — 99213 OFFICE O/P EST LOW 20 MIN: CPT

## 2023-04-24 RX ORDER — HYDROCHLOROTHIAZIDE 12.5 MG/1
12.5 TABLET ORAL
Refills: 0 | Status: ACTIVE | COMMUNITY
Start: 2023-04-24

## 2023-04-28 LAB
T4 FREE SERPL-MCNC: 1.2 NG/DL
THYROGLOB AB SERPL-ACNC: <20 IU/ML
THYROGLOB SERPL-MCNC: <0.2 NG/ML
TSH SERPL-ACNC: 0.61 UIU/ML

## 2023-05-02 ENCOUNTER — OUTPATIENT (OUTPATIENT)
Dept: OUTPATIENT SERVICES | Facility: HOSPITAL | Age: 78
LOS: 1 days | End: 2023-05-02
Payer: MEDICARE

## 2023-05-02 ENCOUNTER — APPOINTMENT (OUTPATIENT)
Dept: ULTRASOUND IMAGING | Facility: CLINIC | Age: 78
End: 2023-05-02
Payer: MEDICARE

## 2023-05-02 DIAGNOSIS — C73 MALIGNANT NEOPLASM OF THYROID GLAND: ICD-10-CM

## 2023-05-02 PROCEDURE — 76536 US EXAM OF HEAD AND NECK: CPT | Mod: 26

## 2023-05-02 PROCEDURE — 76536 US EXAM OF HEAD AND NECK: CPT

## 2023-05-22 ENCOUNTER — RX RENEWAL (OUTPATIENT)
Age: 78
End: 2023-05-22

## 2023-05-23 NOTE — HISTORY OF PRESENT ILLNESS
[FreeTextEntry1] : 77-year-old man, hearing impaired, here for endocrinology follow-up for history of papillary thyroid cancer, hypothyroidism.\par  \par We were able to utilize [employer] , American sign language.\par \par He was diagnosed with papillary thyroid cancer in 2010.  Largest tumor or dementia was 1.8 cm, papillary thyroid cancer, T3 Nx, with minimal extrathyroidal extension.  He was treated with total thyroidectomy followed by radioactive iodine in 2010, 150 mci and had whole body scan in 2011 which showed less than 1% uptake. \par \par His thyroid ultrasound in June 2018, there is no evidence of locally metastatic disease.  Another repeat ultrasound was done earlier this month.  September 3, 2020 no evidence of recurrent disease in the thyroidectomy bed.  No evidence of adenopathy. US again in Sept 2021 within normal li mits.  \par \par He has been doing well since last visit.  \par \par In regards to his hypertension, he is currently on losartan 50mg once daily \par \par In regards to his hyperlipidemia, currently being managed by his cardiologist.  He is on Zetia 10 mg once daily as well as atorvastatin 40 mg once daily.  He reports that he was recently started on hydrochlorothiazide 12.5 mg 3 times a week by his cardiologist.\par \par Currently off vitamin D and calcium supplements.

## 2023-05-23 NOTE — ASSESSMENT
[FreeTextEntry1] : Patient is a 77 -year-old man with history of hypertension, hyperlipidemia, papillary thyroid cancer, status post radioactive iodine treatment in 2010, here for endocrinology follow-up.\par \par 1.  Papillary thyroid cancer.\par He had 1.8 cm papillary thyroid cancer with minimal extrathyroidal extension T3Nx treated with surgery and radioactive iodine in 2010. He is clinically euthyroid.  He has excellent biochemical and structural response.  TSH goal between 0.5-2.0uIU/mL.  He is currently on levothyroxine 112 mcg. \par Also ultrasound done in September 21, 2021.  No Recurrent locally metastatic disease.\par Repeat ultrasound in September 2022.\par We will obtain TSH, free T4 and thyroglobulin level today.\par \par 2.  Hypertension\par Blood pressure today 110/80\par Continue management as per his cardiologist.\par \par 3.  Hyperlipidemia\par LDL 62 mg/dL which is currently on target blood was drawn in 8/10/2021\par Patient is on atorvastatin 40 mg once daily, tolerating well, no issues.\par Patient is currently on Zetia 10 mg once daily.\par Continue to follow with cardiology. \par \par Follow-up in 1 year

## 2023-05-23 NOTE — PHYSICAL EXAM
[Oriented x3] : oriented to person, place, and time [Normal Affect] : the affect was normal [Normal Mood] : the mood was normal [de-identified] : Patient appears alert.  Well-nourished.  Healthy appearance. [de-identified] : There is no proptosis. [de-identified] : Deferred due to mask wearing mandates. [de-identified] : There is no lymphadenopathy.  There is a well-healed scar. [de-identified] : No respiratory distress.  No accessory muscle use.  Normal rate and effort.  Clear to auscultation. [de-identified] : There is no murmurs, rubs, normal S1-S2. [de-identified] : There is no edema.

## 2023-09-10 ENCOUNTER — NON-APPOINTMENT (OUTPATIENT)
Age: 78
End: 2023-09-10

## 2023-10-10 ENCOUNTER — APPOINTMENT (OUTPATIENT)
Dept: OPHTHALMOLOGY | Facility: CLINIC | Age: 78
End: 2023-10-10
Payer: MEDICARE

## 2023-10-10 ENCOUNTER — NON-APPOINTMENT (OUTPATIENT)
Age: 78
End: 2023-10-10

## 2023-10-10 PROCEDURE — 92014 COMPRE OPH EXAM EST PT 1/>: CPT

## 2023-11-13 ENCOUNTER — RX RENEWAL (OUTPATIENT)
Age: 78
End: 2023-11-13

## 2024-04-23 ENCOUNTER — APPOINTMENT (OUTPATIENT)
Dept: ENDOCRINOLOGY | Facility: CLINIC | Age: 79
End: 2024-04-23
Payer: MEDICARE

## 2024-04-23 VITALS
WEIGHT: 200 LBS | DIASTOLIC BLOOD PRESSURE: 72 MMHG | HEART RATE: 64 BPM | OXYGEN SATURATION: 98 % | SYSTOLIC BLOOD PRESSURE: 122 MMHG | HEIGHT: 67 IN | BODY MASS INDEX: 31.39 KG/M2

## 2024-04-23 PROCEDURE — G2211 COMPLEX E/M VISIT ADD ON: CPT

## 2024-04-23 PROCEDURE — 99213 OFFICE O/P EST LOW 20 MIN: CPT

## 2024-04-23 NOTE — REASON FOR VISIT
[Follow - Up] : a follow-up visit [Thyroid Cancer] : thyroid cancer [Ad Hoc ] : provided by an ad hoc  [TWNoteComboBox1] : American Sign Language

## 2024-04-23 NOTE — ASSESSMENT
[FreeTextEntry1] : Patient is a 78 -year-old man with history of hypertension, hyperlipidemia, papillary thyroid cancer, status post radioactive iodine treatment in 2010, here for endocrinology follow-up.  1.  Papillary thyroid cancer. He had 1.8 cm papillary thyroid cancer with minimal extrathyroidal extension T3Nx treated with surgery and radioactive iodine in 2010. He is clinically euthyroid.  He has excellent biochemical and structural response.  TSH goal between 0.5-2.0uIU/mL.  He is currently on levothyroxine 112 mcg.  Also ultrasound done in September 21, 2021.  No Recurrent locally metastatic disease. Repeat ultrasound in September 2022. We will obtain TSH, free T4 and thyroglobulin level today.  2.  Hypertension Continue management as per his cardiologist.  3.  Hyperlipidemia Patient is on atorvastatin 40 mg once daily, tolerating well, no issues. Patient is currently on Zetia 10 mg once daily. Continue to follow with cardiology.   Follow-up in 1 year

## 2024-04-23 NOTE — HISTORY OF PRESENT ILLNESS
[FreeTextEntry1] : 78-year-old man, hearing impaired, here for endocrinology follow-up for history of papillary thyroid cancer, hypothyroidism.  He was diagnosed with papillary thyroid cancer in 2010.  Largest tumor or dementia was 1.8 cm, papillary thyroid cancer, T3 Nx, with minimal extrathyroidal extension.  He was treated with total thyroidectomy followed by radioactive iodine in 2010, 150 mci and had whole body scan in 2011 which showed less than 1% uptake.   His thyroid ultrasound in June 2018, there is no evidence of locally metastatic disease.  Another repeat ultrasound was done earlier this month.  September 3, 2020 no evidence of recurrent disease in the thyroidectomy bed.  No evidence of adenopathy. US again in Sept 2021 within normal limits.  US repeated in 2023 and also no lymphadenopathy.    He has been doing well since last visit.    In regards to his hypertension, he is currently on losartan 50mg once daily.  Recently started on HCTZ 3 days a week by cardiologist.    In regards to his hyperlipidemia, currently being managed by his cardiologist.  He is on Zetia 10 mg once daily as well as atorvastatin 40 mg once daily.  He reports that he was recently started on hydrochlorothiazide 12.5 mg 3 times a week by his cardiologist.  Currently off vitamin D and calcium supplements.

## 2024-05-01 LAB
T4 FREE SERPL-MCNC: 1.4 NG/DL
THYROGLOB AB SERPL-ACNC: <20 IU/ML
THYROGLOB SERPL-MCNC: <0.2 NG/ML
TSH SERPL-ACNC: 0.43 UIU/ML

## 2024-05-10 ENCOUNTER — RX RENEWAL (OUTPATIENT)
Age: 79
End: 2024-05-10

## 2024-05-10 RX ORDER — LEVOTHYROXINE SODIUM 0.11 MG/1
112 TABLET ORAL
Qty: 90 | Refills: 1 | Status: ACTIVE | COMMUNITY
Start: 2023-05-22 | End: 1900-01-01

## 2024-06-08 ENCOUNTER — NON-APPOINTMENT (OUTPATIENT)
Age: 79
End: 2024-06-08

## 2024-10-17 ENCOUNTER — RX RENEWAL (OUTPATIENT)
Age: 79
End: 2024-10-17

## 2025-01-08 ENCOUNTER — NON-APPOINTMENT (OUTPATIENT)
Age: 80
End: 2025-01-08

## 2025-01-08 ENCOUNTER — APPOINTMENT (OUTPATIENT)
Dept: OPHTHALMOLOGY | Facility: CLINIC | Age: 80
End: 2025-01-08
Payer: MEDICARE

## 2025-01-08 PROCEDURE — 92014 COMPRE OPH EXAM EST PT 1/>: CPT

## 2025-02-15 ENCOUNTER — NON-APPOINTMENT (OUTPATIENT)
Age: 80
End: 2025-02-15

## 2025-06-10 ENCOUNTER — APPOINTMENT (OUTPATIENT)
Dept: ENDOCRINOLOGY | Facility: CLINIC | Age: 80
End: 2025-06-10
Payer: MEDICARE

## 2025-06-10 VITALS
SYSTOLIC BLOOD PRESSURE: 124 MMHG | HEIGHT: 67 IN | BODY MASS INDEX: 32.96 KG/M2 | WEIGHT: 210 LBS | OXYGEN SATURATION: 99 % | HEART RATE: 74 BPM | DIASTOLIC BLOOD PRESSURE: 72 MMHG

## 2025-06-10 LAB
T4 FREE SERPL-MCNC: 1.1 NG/DL
THYROGLOB AB SERPL-ACNC: 18.3 IU/ML
THYROGLOB SERPL-MCNC: <0.1 NG/ML
TSH SERPL-ACNC: 7.58 UIU/ML

## 2025-06-10 PROCEDURE — 99214 OFFICE O/P EST MOD 30 MIN: CPT

## 2025-06-10 PROCEDURE — G2211 COMPLEX E/M VISIT ADD ON: CPT

## 2025-06-13 ENCOUNTER — OUTPATIENT (OUTPATIENT)
Dept: OUTPATIENT SERVICES | Facility: HOSPITAL | Age: 80
LOS: 1 days | End: 2025-06-13
Payer: MEDICARE

## 2025-06-13 ENCOUNTER — APPOINTMENT (OUTPATIENT)
Dept: ULTRASOUND IMAGING | Facility: CLINIC | Age: 80
End: 2025-06-13

## 2025-06-13 DIAGNOSIS — C73 MALIGNANT NEOPLASM OF THYROID GLAND: ICD-10-CM

## 2025-06-13 PROCEDURE — 76536 US EXAM OF HEAD AND NECK: CPT

## 2025-06-13 PROCEDURE — 76536 US EXAM OF HEAD AND NECK: CPT | Mod: 26

## 2025-08-04 ENCOUNTER — APPOINTMENT (OUTPATIENT)
Dept: OPHTHALMOLOGY | Facility: CLINIC | Age: 80
End: 2025-08-04

## 2025-08-04 PROCEDURE — 92285 EXTERNAL OCULAR PHOTOGRAPHY: CPT

## 2025-08-04 PROCEDURE — 99214 OFFICE O/P EST MOD 30 MIN: CPT

## 2025-08-28 ENCOUNTER — NON-APPOINTMENT (OUTPATIENT)
Age: 80
End: 2025-08-28

## 2025-08-28 ENCOUNTER — APPOINTMENT (OUTPATIENT)
Dept: OPHTHALMOLOGY | Facility: CLINIC | Age: 80
End: 2025-08-28
Payer: MEDICARE

## 2025-08-28 PROCEDURE — 92285 EXTERNAL OCULAR PHOTOGRAPHY: CPT

## 2025-08-28 PROCEDURE — 67800 REMOVE EYELID LESION: CPT | Mod: E1

## 2025-08-28 PROCEDURE — 99214 OFFICE O/P EST MOD 30 MIN: CPT | Mod: 25

## 2025-09-08 ENCOUNTER — NON-APPOINTMENT (OUTPATIENT)
Age: 80
End: 2025-09-08

## 2025-09-11 ENCOUNTER — APPOINTMENT (OUTPATIENT)
Dept: OPHTHALMOLOGY | Facility: CLINIC | Age: 80
End: 2025-09-11
Payer: MEDICARE

## 2025-09-11 ENCOUNTER — NON-APPOINTMENT (OUTPATIENT)
Age: 80
End: 2025-09-11

## 2025-09-11 PROCEDURE — 99213 OFFICE O/P EST LOW 20 MIN: CPT

## 2025-09-11 PROCEDURE — 92285 EXTERNAL OCULAR PHOTOGRAPHY: CPT
